# Patient Record
Sex: FEMALE | Race: WHITE
[De-identification: names, ages, dates, MRNs, and addresses within clinical notes are randomized per-mention and may not be internally consistent; named-entity substitution may affect disease eponyms.]

---

## 2018-01-07 ENCOUNTER — HOSPITAL ENCOUNTER (EMERGENCY)
Dept: HOSPITAL 76 - ED | Age: 23
Discharge: HOME | End: 2018-01-07
Payer: COMMERCIAL

## 2018-01-07 VITALS — DIASTOLIC BLOOD PRESSURE: 70 MMHG | SYSTOLIC BLOOD PRESSURE: 119 MMHG

## 2018-01-07 DIAGNOSIS — J45.909: ICD-10-CM

## 2018-01-07 DIAGNOSIS — F41.9: ICD-10-CM

## 2018-01-07 DIAGNOSIS — N39.0: ICD-10-CM

## 2018-01-07 DIAGNOSIS — K52.9: Primary | ICD-10-CM

## 2018-01-07 LAB
CLARITY UR REFRACT.AUTO: (no result)
GLUCOSE UR QL STRIP.AUTO: NEGATIVE MG/DL
HCG UR QL: NEGATIVE
KETONES UR QL STRIP.AUTO: NEGATIVE MG/DL
MUCOUS THREADS URNS QL MICRO: (no result)
NITRITE UR QL STRIP.AUTO: NEGATIVE
PH UR STRIP.AUTO: 6 PH (ref 5–7.5)
PROT UR STRIP.AUTO-MCNC: NEGATIVE MG/DL
RBC # UR STRIP.AUTO: (no result) /UL
SP GR UR STRIP.AUTO: >=1.03 (ref 1–1.03)
SQUAMOUS URNS QL MICRO: (no result)
UROBILINOGEN UR QL STRIP.AUTO: (no result) E.U./DL
UROBILINOGEN UR STRIP.AUTO-MCNC: NEGATIVE MG/DL

## 2018-01-07 PROCEDURE — 81001 URINALYSIS AUTO W/SCOPE: CPT

## 2018-01-07 PROCEDURE — 81003 URINALYSIS AUTO W/O SCOPE: CPT

## 2018-01-07 PROCEDURE — 87086 URINE CULTURE/COLONY COUNT: CPT

## 2018-01-07 PROCEDURE — 81025 URINE PREGNANCY TEST: CPT

## 2018-01-07 PROCEDURE — 99283 EMERGENCY DEPT VISIT LOW MDM: CPT

## 2018-01-07 NOTE — ED PHYSICIAN DOCUMENTATION
PD HPI NVD





- Stated complaint


Stated Complaint: ABDOMINAL PAIN,VOMITING





- Chief complaint


Chief Complaint: Abd Pain





- History obtained from


History obtained from: Patient





- History of Present Illness


Timing - onset: How many hours ago (1)


Timing - details: Abrupt onset


Associated symptoms: Abdominal pain.  No: Fever, Chest pain


Contributing factors: No: Sick contact, Bad food


Similar symptoms before: Has not had sx before


Recently seen: Not recently seen





- Additonal information


Additional information: 





Patient is a 22 year old female with a history of anxiety who is presenting to 

the emergency department for an hour of abdominal pain and one episode of 

vomiting.  Patient states that she woke up with abdominal pain and threw up 

once and then came to the emergency department.  





Review of Systems


Constitutional: denies: Fever, Chills


Eyes: reports: Reviewed and negative


Ears: reports: Reviewed and negative


Nose: reports: Reviewed and negative


Throat: reports: Reviewed and negative


GI: reports: Abdominal Pain, Nausea, Vomiting.  denies: Constipation, Diarrhea


: reports: Vaginal bleeding.  denies: Dysuria, Frequency


Skin: denies: Rash, Lesions


Musculoskeletal: reports: Neck pain, Back pain, Extremity pain


Neurologic: reports: Generalized weakness, Focal weakness


Psychiatric: reports: Anxiety


Immunocompromised: denies: Immunocompromised





PD PAST MEDICAL HISTORY





- Past Medical History


Past Medical History: Yes


Respiratory: Asthma


Neuro: Headache/migraine


Psych: Anxiety





- Past Surgical History


Past Surgical History: Yes





- Present Medications


Home Medications: 


 Ambulatory Orders











 Medication  Instructions  Recorded  Confirmed


 


Sertraline HCl 1 tab PO DAILY 05/08/16 01/07/18


 


Dicyclomine [Bentyl] 10 mg PO QID #14 capsule 01/07/18 


 


Ondansetron Odt [Zofran] 4 mg TL Q6H PRN #20 tablet 01/07/18 


 


Sulfamethox/Trimeth 800/160 1 each PO BID #14 tablet 01/07/18 





[Bactrim Ds 800/160]   














- Allergies


Allergies/Adverse Reactions: 


 Allergies











Allergy/AdvReac Type Severity Reaction Status Date / Time


 


No Known Drug Allergies Allergy   Verified 05/08/16 02:52














- Social History


Does the pt smoke?: No


Smoking Status: Never smoker


Does the pt drink ETOH?: Yes


Does the pt have substance abuse?: No





- Immunizations


Immunizations are current?: Yes





- POLST


Patient has POLST: No





PD ED PE NORMAL





- Vitals


Vital signs reviewed: Yes





- General


General: Alert and oriented X 3, No acute distress, Well developed/nourished





- HEENT


HEENT: Atraumatic, PERRL, Moist mucous membranes, Pharynx benign





- Neck


Neck: Supple, no meningeal sign, No JVD





- Cardiac


Cardiac: RRR, No murmur





- Respiratory


Respiratory: No respiratory distress





- Abdomen


Abdomen: Soft, Non tender, Non distended





- Derm


Derm: Normal color, Warm and dry, No rash





- Extremities


Extremities: No deformity, Normal ROM s pain, No calf tenderness / cord





- Neuro


Neuro: Alert and oriented X 3, No motor deficit, No sensory deficit, Normal 

speech





PD ED PE EXPANDED





- General


General: Alert, Anxious





Results





- Vitals


Vitals: 


 Vital Signs - 24 hr











  01/07/18





  04:40


 


Temperature 36.0 C L


 


Heart Rate 84


 


Respiratory 18





Rate 


 


Blood Pressure 128/81 H


 


O2 Saturation 99








 Oxygen











O2 Source                      Room air

















- Labs


Labs: 


 Laboratory Tests











  01/07/18





  05:12


 


Urine Color  YELLOW


 


Urine Clarity  HAZY


 


Urine pH  6.0


 


Ur Specific Gravity  >=1.030 H


 


Urine Protein  NEGATIVE


 


Urine Glucose (UA)  NEGATIVE


 


Urine Ketones  NEGATIVE


 


Urine Occult Blood  LARGE H


 


Urine Nitrite  NEGATIVE


 


Urine Bilirubin  NEGATIVE


 


Urine Urobilinogen  0.2 (NORMAL)


 


Ur Leukocyte Esterase  SMALL H


 


Urine RBC  11-25 H


 


Urine WBC  11-25 H


 


Ur Squamous Epith Cells  MANY Squamous H


 


Urine Bacteria  Many H


 


Urine Mucus  Few Strands


 


Ur Microscopic Review  INDICATED


 


Urine Culture Comments  NOT INDICATED


 


Urine HCG, Qual  NEGATIVE














PD MEDICAL DECISION MAKING





- ED course


Complexity details: reviewed old records, reviewed results, re-evaluated patient

, considered differential, d/w patient


ED course: 





Patient was seen and examined at bedside.  Patient's vital signs were within 

normal limits.  Patient was treated with zofran and bentyl.  One episode of 

vomiting is not an indication for extensive work up.  urine was collected and 

patient was not pregnant but did have findings suggestive of a urinary tract 

infection.   Patient was made aware that extra testing was not indicated at 

this time.  She was told she might develop diarrhea and more vomiting.  patient 

was given detailed discharge and follow up instructions.  patient required no 

further work up and was stable for discharge with outpatient follow up.  





Departure





- Departure


Disposition: 01 Home, Self Care


Clinical Impression: 


 Gastroenteritis, Urinary tract infection





Condition: Good


Instructions:  ED Gastroenteritis Viral, ED UTI Cystitis Female


Follow-Up: 


Carlisle,Lance, MD [Primary Care Provider] - Within 1 week


Prescriptions: 


Dicyclomine [Bentyl] 10 mg PO QID #14 capsule


Ondansetron Odt [Zofran] 4 mg TL Q6H PRN #20 tablet


 PRN Reason: Nausea / Vomiting


Sulfamethox/Trimeth 800/160 [Bactrim Ds 800/160] 1 each PO BID #14 tablet


Comments: 


Your symptoms are likely being caused by gastroenteritis and a urinary tract 

infection.  For the gastroenteritis you should take the zofran and bentyl and 

make sure you stay well hydrated.  You may develop some diarrhea along with it.

  For the urinary tract infection you had your first dose of antibiotics and 

you will be on them for the next week.  You can take motrin or tylenol as 

needed for pain.  You should follow up with your doctor if your symptoms 

persist.  You may return to the emergency department at any time for new, 

worsening or uncontrollable symptoms.